# Patient Record
Sex: FEMALE | Race: BLACK OR AFRICAN AMERICAN | Employment: FULL TIME | URBAN - METROPOLITAN AREA
[De-identification: names, ages, dates, MRNs, and addresses within clinical notes are randomized per-mention and may not be internally consistent; named-entity substitution may affect disease eponyms.]

---

## 2019-10-20 ENCOUNTER — HOSPITAL ENCOUNTER (EMERGENCY)
Age: 39
Discharge: STILL ADMITTED OR ADMINISTRATIVE | End: 2019-10-20
Attending: EMERGENCY MEDICINE

## 2019-10-26 ENCOUNTER — HOSPITAL ENCOUNTER (OUTPATIENT)
Dept: CT IMAGING | Age: 39
Discharge: HOME | End: 2019-10-26
Attending: NURSE PRACTITIONER
Payer: OTHER GOVERNMENT

## 2019-10-26 DIAGNOSIS — R56.9 SEIZURE (HCC): ICD-10-CM

## 2019-10-26 LAB — CREAT UR-MCNC: 0.8 MG/DL (ref 0.6–1.3)

## 2019-10-26 PROCEDURE — 82565 ASSAY OF CREATININE: CPT

## 2019-10-26 PROCEDURE — 70470 CT HEAD/BRAIN W/O & W/DYE: CPT

## 2019-10-26 PROCEDURE — 74011636320 HC RX REV CODE- 636/320: Performed by: RADIOLOGY

## 2019-10-26 RX ADMIN — IOPAMIDOL 100 ML: 612 INJECTION, SOLUTION INTRAVENOUS at 10:05

## 2021-07-23 ENCOUNTER — HOSPITAL ENCOUNTER (EMERGENCY)
Age: 41
Discharge: HOME | End: 2021-07-23
Attending: EMERGENCY MEDICINE
Payer: COMMERCIAL

## 2021-07-23 VITALS
DIASTOLIC BLOOD PRESSURE: 95 MMHG | WEIGHT: 223 LBS | TEMPERATURE: 97.8 F | HEART RATE: 96 BPM | RESPIRATION RATE: 17 BRPM | SYSTOLIC BLOOD PRESSURE: 182 MMHG | OXYGEN SATURATION: 100 %

## 2021-07-23 DIAGNOSIS — H10.12 ALLERGIC CONJUNCTIVITIS OF LEFT EYE: ICD-10-CM

## 2021-07-23 DIAGNOSIS — H11.422 CHEMOSIS OF CONJUNCTIVA, LEFT: Primary | ICD-10-CM

## 2021-07-23 PROCEDURE — 74011636637 HC RX REV CODE- 636/637: Performed by: EMERGENCY MEDICINE

## 2021-07-23 PROCEDURE — 99284 EMERGENCY DEPT VISIT MOD MDM: CPT

## 2021-07-23 PROCEDURE — 74011000250 HC RX REV CODE- 250: Performed by: EMERGENCY MEDICINE

## 2021-07-23 PROCEDURE — 74011250637 HC RX REV CODE- 250/637: Performed by: EMERGENCY MEDICINE

## 2021-07-23 PROCEDURE — 99281 EMR DPT VST MAYX REQ PHY/QHP: CPT

## 2021-07-23 RX ORDER — DIPHENHYDRAMINE HCL 25 MG
50 CAPSULE ORAL
Status: DISCONTINUED | OUTPATIENT
Start: 2021-07-23 | End: 2021-07-24 | Stop reason: HOSPADM

## 2021-07-23 RX ORDER — CIPROFLOXACIN HYDROCHLORIDE 3.5 MG/ML
2 SOLUTION/ DROPS TOPICAL
Status: COMPLETED | OUTPATIENT
Start: 2021-07-23 | End: 2021-07-23

## 2021-07-23 RX ORDER — PROPARACAINE HYDROCHLORIDE 5 MG/ML
2 SOLUTION/ DROPS OPHTHALMIC
Status: DISCONTINUED | OUTPATIENT
Start: 2021-07-23 | End: 2021-07-24 | Stop reason: HOSPADM

## 2021-07-23 RX ORDER — DIPHENHYDRAMINE HCL 25 MG
CAPSULE ORAL
Qty: 16 CAPSULE | Refills: 0 | Status: SHIPPED | OUTPATIENT
Start: 2021-07-23

## 2021-07-23 RX ORDER — PREDNISONE 20 MG/1
40 TABLET ORAL
Status: COMPLETED | OUTPATIENT
Start: 2021-07-23 | End: 2021-07-23

## 2021-07-23 RX ORDER — LORATADINE 10 MG/1
TABLET ORAL
Qty: 14 TABLET | Refills: 0 | Status: SHIPPED | OUTPATIENT
Start: 2021-07-23

## 2021-07-23 RX ORDER — PREDNISONE 20 MG/1
40 TABLET ORAL DAILY
Qty: 10 TABLET | Refills: 0 | Status: SHIPPED | OUTPATIENT
Start: 2021-07-23 | End: 2021-07-28

## 2021-07-23 RX ORDER — CIPROFLOXACIN HYDROCHLORIDE 3.5 MG/ML
1 SOLUTION/ DROPS TOPICAL 4 TIMES DAILY
Qty: 2.5 ML | Refills: 0 | Status: SHIPPED | OUTPATIENT
Start: 2021-07-23

## 2021-07-23 RX ORDER — FAMOTIDINE 20 MG/1
40 TABLET, FILM COATED ORAL
Status: COMPLETED | OUTPATIENT
Start: 2021-07-23 | End: 2021-07-23

## 2021-07-23 RX ORDER — FAMOTIDINE 20 MG/1
20 TABLET, FILM COATED ORAL 2 TIMES DAILY
Qty: 10 TABLET | Refills: 0 | Status: SHIPPED | OUTPATIENT
Start: 2021-07-23 | End: 2021-07-28

## 2021-07-23 RX ADMIN — PREDNISONE 40 MG: 20 TABLET ORAL at 20:19

## 2021-07-23 RX ADMIN — CIPROFLOXACIN 2 DROP: 3 SOLUTION OPHTHALMIC at 20:19

## 2021-07-23 RX ADMIN — FAMOTIDINE 40 MG: 20 TABLET, FILM COATED ORAL at 20:19

## 2021-07-27 NOTE — ED PROVIDER NOTES
EMERGENCY DEPARTMENT HISTORY AND PHYSICAL EXAM    Date: 7/23/2021  Patient Name: Rashi De La Torre    History of Presenting Illness     Chief Complaint   Patient presents with    Eye Pain         History Provided By: Patient    Additional History (Context):   7:45 PM  Rashi De La Torre is a 36 y.o. female with PMHX of HTN, Seizures who presents to the emergency department C/O swollen eye. Patient states she was applying eye make-up when she had brushed my contact with her eye. She is a contact lens wearer and was using as of the time of this incident. Contacts was removed but now she has popping and swelling \"to the white part of my eye\". She has no significant allergies otherwise. She denies any other significant impact or injury. She has no significant history of skin reactions or allergies. She does have a history of high blood pressure and takes her medications regularly. Blood pressure is elevated but some from the eye incident she denies any headache chest pain shortness of breath or changes in urinary output or color. Social History  No smoking, drinking or drugs    Family History  No history of glaucoma or significant allergies. PCP: Dawood, MD Hong    Current Outpatient Medications   Medication Sig Dispense Refill    ciprofloxacin HCl (Ciloxan) 0.3 % ophthalmic solution Apply 1 Drop to eye four (4) times daily. 2.5 mL 0    diphenhydrAMINE (BenadryL) 25 mg capsule Take 1-2 tabs every 6 hours as needed for more severe symptoms. 16 Capsule 0    loratadine (Claritin) 10 mg tablet Take 1 tab by mouth daily for seven (7) days. Continue after 7 days only if symptoms are still present. Restart for 7 day intervals if sinus congestion symptoms return. 14 Tablet 0    famotidine (Pepcid) 20 mg tablet Take 1 Tablet by mouth two (2) times a day for 5 days. 10 Tablet 0    predniSONE (DELTASONE) 20 mg tablet Take 40 mg by mouth daily for 5 doses.  With Breakfast 10 Tablet 0       Past History     Past Medical History:  Past Medical History:   Diagnosis Date    Hypertension     Seizures ([address]        Past Surgical History:  No past surgical history on file. Family History:  No family history on file. Social History:  Social History     Tobacco Use    Smoking status: Not on file   Substance Use Topics    Alcohol use: Not on file    Drug use: Not on file       Allergies:  No Known Allergies      Review of Systems   Review of Systems   Eyes: Positive for itching. Negative for photophobia, pain, discharge, redness and visual disturbance. Respiratory: Negative for shortness of breath. Skin: Negative for rash. All other systems reviewed and are negative. Physical Exam     Vitals:    07/23/21 1937 07/23/21 2030   BP: (!) 181/91 (!) 182/95   Pulse: (!) 110 96   Resp: 20 17   Temp: 97.8 °F (36.6 °C)    SpO2: 100% 100%   Weight: 101.2 kg (223 lb)      Physical Exam  Vitals and nursing note reviewed. Constitutional:       General: She is not in acute distress. Appearance: She is well-developed. She is not ill-appearing, toxic-appearing or diaphoretic. HENT:      Head: Normocephalic and atraumatic. Eyes:      General: No scleral icterus. Extraocular Movements:      Right eye: Normal extraocular motion and no nystagmus. Left eye: Normal extraocular motion and no nystagmus. Conjunctiva/sclera: Conjunctivae normal.      Right eye: Right conjunctiva is not injected. No chemosis, exudate or hemorrhage. Left eye: Chemosis present. No exudate or hemorrhage. Pupils: Pupils are equal, round, and reactive to light. Left eye: Pupil is round, reactive and not sluggish. No corneal abrasion or fluorescein uptake. Dory exam negative. Funduscopic exam:        Left eye: No hemorrhage or AV nicking. Slit lamp exam:     Left eye: Anterior chamber quiet. No corneal ulcer, hyphema, hypopyon, anterior chamber bulge or photophobia.       Comments: OS with minimal injection   Neck: Trachea: No tracheal deviation. Pulmonary:      Effort: Pulmonary effort is normal. No respiratory distress. Breath sounds: No stridor. Musculoskeletal:         General: No tenderness. Normal range of motion. Cervical back: Normal range of motion and neck supple. Comments: Grossly unremarkable without abnormalities   Skin:     General: Skin is warm and dry. Capillary Refill: Capillary refill takes less than 2 seconds. Findings: No erythema or rash. Neurological:      Mental Status: She is alert and oriented to person, place, and time. GCS: GCS eye subscore is 4. GCS verbal subscore is 5. GCS motor subscore is 6. Cranial Nerves: No cranial nerve deficit. Psychiatric:         Attention and Perception: Attention normal.         Mood and Affect: Mood normal.         Speech: Speech normal.         Behavior: Behavior normal.         Thought Content: Thought content normal.         Judgment: Judgment normal.       Diagnostic Study Results     Labs -  No results found for this or any previous visit (from the past 24 hour(s)). Radiologic Studies -   No orders to display     CT Results  (Last 48 hours)    None        CXR Results  (Last 48 hours)    None          Medications given in the ED-  Medications   ciprofloxacin HCl (CILOXAN) 0.3 % ophthalmic solution 2 Drop (2 Drops Left Eye Given 7/23/21 2019)   famotidine (PEPCID) tablet 40 mg (40 mg Oral Given 7/23/21 2019)   predniSONE (DELTASONE) tablet 40 mg (40 mg Oral Given 7/23/21 2019)         Medical Decision Making   I am the first provider for this patient. I reviewed the vital signs, available nursing notes, past medical history, past surgical history, family history and social history. Vital Signs-Reviewed the patient's vital signs.     Pulse Oximetry Analysis - 100% on room air      Records Reviewed: NURSING NOTES AND PREVIOUS MEDICAL RECORDS    Provider Notes (Medical Decision Making):   Patient with isolated eye swelling to the sclera consistent with chemosis reaction. Fluorescein was used confirming she did not have any corneal abrasion or ulceration and this is likely due to the protection of her contact lens. Described the mechanism and she shows no evidence of corneal injury perforation or Dory sign. Started protective antibiotics and suggested she use glasses only wished her precautions until she has complete release motion recommended she see an optometrist or preferably on ophthalmologist in follow-up. Orally she can use allergy-like medications to minimize allergic type response. No indication for oral antibiotics. Early outpatient follow-up recommended and she was given the names of [facility] ophthalmology. To be noted the record we have no ophthalmology on call for this evening and do not find any indications that she requires emergent transfer. Procedures:  Procedures    ED Course:   10:12 PM: Initial assessment performed. The patients presenting problems have been discussed, and they are in agreement with the care plan formulated and outlined with them. I have encouraged them to ask questions as they arise throughout their visit. Diagnosis and Disposition       DISCHARGE NOTE:  9:47 PM  Camilo Erickson's  results have been reviewed with her. She has been counseled regarding her diagnosis, treatment, and plan. She verbally conveys understanding and agreement of the signs, symptoms, diagnosis, treatment and prognosis and additionally agrees to follow up as discussed. She also agrees with the care-plan and conveys that all of her questions have been answered. I have also provided discharge instructions for her that include: educational information regarding their diagnosis and treatment, and list of reasons why they would want to return to the ED prior to their follow-up appointment, should her condition change. She has been provided with education for proper emergency department utilization. CLINICAL IMPRESSION:    1. Chemosis of conjunctiva, left    2. Allergic conjunctivitis of left eye        PLAN:  1. D/C Home  2. Discharge Medication List as of 7/23/2021  9:47 PM      START taking these medications    Details   ciprofloxacin HCl (Ciloxan) 0.3 % ophthalmic solution Apply 1 Drop to eye four (4) times daily. , Normal, Disp-2.5 mL, R-0      diphenhydrAMINE (BenadryL) 25 mg capsule Take 1-2 tabs every 6 hours as needed for more severe symptoms. , Normal, Disp-16 Capsule, R-0      loratadine (Claritin) 10 mg tablet Take 1 tab by mouth daily for seven (7) days. Continue after 7 days only if symptoms are still present. Restart for 7 day intervals if sinus congestion symptoms return., Normal, Disp-14 Tablet, R-0      famotidine (Pepcid) 20 mg tablet Take 1 Tablet by mouth two (2) times a day for 5 days. , Normal, Disp-10 Tablet, R-0      predniSONE (DELTASONE) 20 mg tablet Take 40 mg by mouth daily for 5 doses. With Breakfast, Normal, Disp-10 Tablet, R-0           3. Follow-up Information     Follow up With Specialties Details Why Contact Info    Vonnie De Dios NP Nurse Practitioner In 1 week  [address] [facility],[address]      Chelle Saunders MD Ophthalmology In 4 days  [place] [address]  [phone]          _______________________________    This note was partially transcribed via voice recognition software. Although efforts have been made to catch any discrepancies, it may contain sound alike words, grammatical errors, or nonsensical words.

## 2021-10-30 ENCOUNTER — HOSPITAL ENCOUNTER (EMERGENCY)
Age: 41
Discharge: HOME | End: 2021-10-31
Attending: STUDENT IN AN ORGANIZED HEALTH CARE EDUCATION/TRAINING PROGRAM
Payer: COMMERCIAL

## 2021-10-30 ENCOUNTER — APPOINTMENT (OUTPATIENT)
Dept: GENERAL RADIOLOGY | Age: 41
End: 2021-10-30
Attending: PHYSICIAN ASSISTANT
Payer: COMMERCIAL

## 2021-10-30 DIAGNOSIS — R06.2 WHEEZING: ICD-10-CM

## 2021-10-30 DIAGNOSIS — J20.9 ACUTE BRONCHITIS, UNSPECIFIED ORGANISM: ICD-10-CM

## 2021-10-30 DIAGNOSIS — J06.9 ACUTE UPPER RESPIRATORY INFECTION: Primary | ICD-10-CM

## 2021-10-30 LAB
ALBUMIN SERPL-MCNC: 3.9 G/DL (ref 3.4–5)
ALBUMIN/GLOB SERPL: 1.1 {RATIO} (ref 0.8–1.7)
ALP SERPL-CCNC: 76 U/L (ref 45–117)
ALT SERPL-CCNC: 17 U/L (ref 13–56)
ANION GAP SERPL CALC-SCNC: 4 MMOL/L (ref 3–18)
AST SERPL-CCNC: 10 U/L (ref 10–38)
BASOPHILS # BLD: 0.1 K/UL (ref 0–0.1)
BASOPHILS NFR BLD: 1 % (ref 0–2)
BILIRUB SERPL-MCNC: 0.1 MG/DL (ref 0.2–1)
BNP SERPL-MCNC: 30 PG/ML (ref 0–450)
BUN SERPL-MCNC: 12 MG/DL (ref 7–18)
BUN/CREAT SERPL: 14 (ref 12–20)
CALCIUM SERPL-MCNC: 8.8 MG/DL (ref 8.5–10.1)
CHLORIDE SERPL-SCNC: 106 MMOL/L (ref 100–111)
CK MB CFR SERPL CALC: 0.7 % (ref 0–4)
CK MB SERPL-MCNC: 1.6 NG/ML (ref 5–25)
CK SERPL-CCNC: 218 U/L (ref 26–192)
CO2 SERPL-SCNC: 27 MMOL/L (ref 21–32)
CREAT SERPL-MCNC: 0.85 MG/DL (ref 0.6–1.3)
DIFFERENTIAL METHOD BLD: ABNORMAL
EOSINOPHIL # BLD: 0.6 K/UL (ref 0–0.4)
EOSINOPHIL NFR BLD: 6 % (ref 0–5)
ERYTHROCYTE [DISTWIDTH] IN BLOOD BY AUTOMATED COUNT: 21.6 % (ref 11.6–14.5)
GLOBULIN SER CALC-MCNC: 3.6 G/DL (ref 2–4)
GLUCOSE SERPL-MCNC: 160 MG/DL (ref 74–99)
HCG SERPL QL: NEGATIVE
HCT VFR BLD AUTO: 42.8 % (ref 35–45)
HGB BLD-MCNC: 13.6 G/DL (ref 12–16)
LYMPHOCYTES # BLD: 3.3 K/UL (ref 0.9–3.6)
LYMPHOCYTES NFR BLD: 31 % (ref 21–52)
MCH RBC QN AUTO: 26.9 PG (ref 24–34)
MCHC RBC AUTO-ENTMCNC: 31.8 G/DL (ref 31–37)
MCV RBC AUTO: 84.6 FL (ref 78–100)
MONOCYTES # BLD: 0.5 K/UL (ref 0.05–1.2)
MONOCYTES NFR BLD: 5 % (ref 3–10)
NEUTS BAND NFR BLD MANUAL: 1 % (ref 0–5)
NEUTS SEG # BLD: 6.2 K/UL (ref 1.8–8)
NEUTS SEG NFR BLD: 56 % (ref 40–73)
PLATELET # BLD AUTO: 417 K/UL (ref 135–420)
PLATELET COMMENTS,PCOM: ABNORMAL
PMV BLD AUTO: 8.3 FL (ref 9.2–11.8)
POTASSIUM SERPL-SCNC: 3.8 MMOL/L (ref 3.5–5.5)
PROT SERPL-MCNC: 7.5 G/DL (ref 6.4–8.2)
RBC # BLD AUTO: 5.06 M/UL (ref 4.2–5.3)
RBC MORPH BLD: ABNORMAL
RBC MORPH BLD: ABNORMAL
SARS-COV-2, COV2: NORMAL
SODIUM SERPL-SCNC: 137 MMOL/L (ref 136–145)
TROPONIN I SERPL-MCNC: <0.02 NG/ML (ref 0–0.04)
WBC # BLD AUTO: 10.7 K/UL (ref 4.6–13.2)

## 2021-10-30 PROCEDURE — 74011000250 HC RX REV CODE- 250: Performed by: STUDENT IN AN ORGANIZED HEALTH CARE EDUCATION/TRAINING PROGRAM

## 2021-10-30 PROCEDURE — 99284 EMERGENCY DEPT VISIT MOD MDM: CPT

## 2021-10-30 PROCEDURE — U0003 INFECTIOUS AGENT DETECTION BY NUCLEIC ACID (DNA OR RNA); SEVERE ACUTE RESPIRATORY SYNDROME CORONAVIRUS 2 (SARS-COV-2) (CORONAVIRUS DISEASE [COVID-19]), AMPLIFIED PROBE TECHNIQUE, MAKING USE OF HIGH THROUGHPUT TECHNOLOGIES AS DESCRIBED BY CMS-2020-01-R: HCPCS

## 2021-10-30 PROCEDURE — 82553 CREATINE MB FRACTION: CPT

## 2021-10-30 PROCEDURE — 83880 ASSAY OF NATRIURETIC PEPTIDE: CPT

## 2021-10-30 PROCEDURE — 71045 X-RAY EXAM CHEST 1 VIEW: CPT

## 2021-10-30 PROCEDURE — 80053 COMPREHEN METABOLIC PANEL: CPT

## 2021-10-30 PROCEDURE — 84703 CHORIONIC GONADOTROPIN ASSAY: CPT

## 2021-10-30 PROCEDURE — 74011000258 HC RX REV CODE- 258: Performed by: STUDENT IN AN ORGANIZED HEALTH CARE EDUCATION/TRAINING PROGRAM

## 2021-10-30 PROCEDURE — 94640 AIRWAY INHALATION TREATMENT: CPT

## 2021-10-30 PROCEDURE — 85025 COMPLETE CBC W/AUTO DIFF WBC: CPT

## 2021-10-30 PROCEDURE — 93005 ELECTROCARDIOGRAM TRACING: CPT

## 2021-10-30 PROCEDURE — 96365 THER/PROPH/DIAG IV INF INIT: CPT

## 2021-10-30 PROCEDURE — 74011250636 HC RX REV CODE- 250/636: Performed by: STUDENT IN AN ORGANIZED HEALTH CARE EDUCATION/TRAINING PROGRAM

## 2021-10-30 PROCEDURE — 96375 TX/PRO/DX INJ NEW DRUG ADDON: CPT

## 2021-10-30 RX ORDER — IPRATROPIUM BROMIDE AND ALBUTEROL SULFATE 2.5; .5 MG/3ML; MG/3ML
3 SOLUTION RESPIRATORY (INHALATION)
Status: COMPLETED | OUTPATIENT
Start: 2021-10-30 | End: 2021-10-30

## 2021-10-30 RX ADMIN — METHYLPREDNISOLONE SODIUM SUCCINATE 125 MG: 125 INJECTION, POWDER, FOR SOLUTION INTRAMUSCULAR; INTRAVENOUS at 22:37

## 2021-10-30 RX ADMIN — DOXYCYCLINE 100 MG: 100 INJECTION, POWDER, LYOPHILIZED, FOR SOLUTION INTRAVENOUS at 22:39

## 2021-10-30 RX ADMIN — IPRATROPIUM BROMIDE AND ALBUTEROL SULFATE 3 ML: .5; 3 SOLUTION RESPIRATORY (INHALATION) at 22:34

## 2021-10-30 RX ADMIN — SODIUM CHLORIDE, POTASSIUM CHLORIDE, SODIUM LACTATE AND CALCIUM CHLORIDE 500 ML: 600; 310; 30; 20 INJECTION, SOLUTION INTRAVENOUS at 23:19

## 2021-10-31 VITALS
TEMPERATURE: 97.1 F | HEART RATE: 85 BPM | OXYGEN SATURATION: 97 % | RESPIRATION RATE: 20 BRPM | SYSTOLIC BLOOD PRESSURE: 160 MMHG | HEIGHT: 65 IN | WEIGHT: 205 LBS | DIASTOLIC BLOOD PRESSURE: 86 MMHG | BODY MASS INDEX: 34.16 KG/M2

## 2021-10-31 LAB
ATRIAL RATE: 94 BPM
CALCULATED P AXIS, ECG09: 78 DEGREES
CALCULATED R AXIS, ECG10: 63 DEGREES
CALCULATED T AXIS, ECG11: 81 DEGREES
DIAGNOSIS, 93000: NORMAL
P-R INTERVAL, ECG05: 146 MS
Q-T INTERVAL, ECG07: 380 MS
QRS DURATION, ECG06: 78 MS
QTC CALCULATION (BEZET), ECG08: 475 MS
VENTRICULAR RATE, ECG03: 94 BPM

## 2021-10-31 RX ORDER — DOXYCYCLINE HYCLATE 100 MG
100 TABLET ORAL 2 TIMES DAILY
Qty: 14 TABLET | Refills: 0 | Status: SHIPPED | OUTPATIENT
Start: 2021-10-31 | End: 2021-11-07

## 2021-10-31 RX ORDER — ALBUTEROL SULFATE 90 UG/1
2 AEROSOL, METERED RESPIRATORY (INHALATION)
Qty: 1 EACH | Refills: 0 | Status: SHIPPED | OUTPATIENT
Start: 2021-10-31 | End: 2021-11-14

## 2021-10-31 RX ORDER — PREDNISONE 50 MG/1
50 TABLET ORAL DAILY
Qty: 3 TABLET | Refills: 0 | Status: SHIPPED | OUTPATIENT
Start: 2021-10-31 | End: 2021-11-03

## 2021-10-31 NOTE — ED TRIAGE NOTES
Pt c/o shortness of breath onset 10/22. States it has progressively worsened since, becomes short of breath with minimal exertion. O2 93% on room air in triage.

## 2021-10-31 NOTE — ED NOTES
Pt ambulated to bathroom in pt room to void. Audible wheezing heard from pt while ambulating back to bed. Oxygen readings stable on room air.

## 2021-10-31 NOTE — DISCHARGE INSTRUCTIONS
Please return to the ER with any new or worsening symptoms or any other concerns. Please return to the Emergency Department if you develop a fever, chills, cannot eat or drink due to nausea or vomiting, or if any of your symptoms worsen. Please follow up with PCP    Also, It is extremely important that you follow-up with a primary care physician and if you do not have one currently use the contact information provided to obtain an appointment. If none was provided please call the number on the back of your insurance card to locate a Primary care doctor. Many offices have \"cancellation lists\" that you can ask to be placed on; should a patient with an earlier appointment cancel you will be notified to take their place. Please return to the Emergency Room immediately if your symptoms worsen. Please carefully read all discharge instructions    [email or web address]    What are GoodRx coupons? GoodRx coupons will help you pay less than the cash price for your prescription. [employer] free to use and are accepted at virtually every [place] pharmacy. Your pharmacist will know how to enter the codes on the coupon to pull up the lowest discount available.

## 2021-11-01 LAB — SARS-COV-2, NAA: NOT DETECTED

## 2021-11-07 NOTE — ED PROVIDER NOTES
EMERGENCY DEPARTMENT HISTORY AND PHYSICAL EXAM      Date: 10/30/2021  Patient Name: Nicanor Chiu    History of Presenting Illness     Chief Complaint   Patient presents with    Shortness of Breath       History Provided By: Patient    HPI:    Nicanor Chiu is a 39 y.o. female with no significant medical history other than hypertension, who presents to the ED with complaints of cough and congestion for several days (around 7), with feeling like she is wheezing. There is no peritonsillar abscess noted on exam, physical presentation does not indicated epiglottitis or retropharyngeal abscess. She is breathing normally without any impairments, speaking without difficulty during evaluation. Not tripoding or using accessory muscles to breath, no signs of retractions on exam or pursed lip breathing. Moving air well on ausculation without any adventitious sounds other than a mild wheeze (coarse upper)    She is able to answer questions in quick succession without evidence of dyspnea    The patient's cough has not been productive. No hemoptysis or chest pain. The patient denies LE pain or swelling - and has no history of DVT or PE, no family history of clotting disorders, no recent travel, no immobilization, recent Sg, exog. Estrogen or steroid use, or known CA. The patient denies any aggravating or alleviating factors - and has not taken any medications in an attempt to alleviate her symptoms. PMH, PSH, family history, social history, allergies reviewed with the patient with significant items noted above. PCP: Hong Seay MD    Current Outpatient Medications   Medication Sig Dispense Refill    albuterol (PROVENTIL HFA, VENTOLIN HFA, PROAIR HFA) 90 mcg/actuation inhaler Take 2 Puffs by inhalation every four (4) hours as needed for Wheezing for up to 14 days. 1 Each 0    inhalational spacing device 1 Each by Does Not Apply route as needed for Wheezing.  1 Each 0    ciprofloxacin HCl (Ciloxan) 0.3 % ophthalmic solution Apply 1 Drop to eye four (4) times daily. 2.5 mL 0    diphenhydrAMINE (BenadryL) 25 mg capsule Take 1-2 tabs every 6 hours as needed for more severe symptoms. 16 Capsule 0    loratadine (Claritin) 10 mg tablet Take 1 tab by mouth daily for seven (7) days. Continue after 7 days only if symptoms are still present. Restart for 7 day intervals if sinus congestion symptoms return. 14 Tablet 0       Past History     Past Medical History:  Past Medical History:   Diagnosis Date    Hypertension     Seizures ([address]        Past Surgical History:  No past surgical history on file. Family History:  No family history on file.     Social History:  Social History     Tobacco Use    Smoking status: Former Smoker    Smokeless tobacco: Never Used   Substance Use Topics    Alcohol use: Not Currently    Drug use: Never       Allergies:  No Known Allergies    Review of Systems   Review of Systems    In addition to that documented in the HPI above  All other review of systems negative    Constitutional: Denies fevers or chills  Eyes: Denies vision changes  ENMT: Denies sore throat  CV: Denies chest pain  Resp: Denies SOB  GI: Denies vomiting or diarrhea  : Denies painful urination  MSK: Denies recent trauma  Skin: Denies new rashes  Neuro: Denies new numbness or tingling or weakness  Endocrine: Denies polyuria  Heme: Denies bleeding disorders    Physical Exam     Vitals:    10/30/21 2245 10/30/21 2249 10/30/21 2315 10/30/21 2345   BP: (!) 181/106  (!) 165/93 (!) 160/86   Pulse:  85     Resp:       Temp:       SpO2: 96%  97% 97%   Weight:       Height:         Physical Exam    Nursing notes and vital signs reviewed    General: Patient is awake and alert, resting comfortably in no acute distress  Head: Normocephalic and atraumatic  Eyes: Extraocular muscles intact, no conjunctival pallor  Ear, nose, throat: Normal external exam  Neck: Normal range of motion  Cardiovascular: RRR, no murmur auscultated, warm, well-perfused extremities  Respiratory: Patient is in no respiratory distress, lungs with end expiratory wheeze  GI: soft, non-tender, non-distended  MSK: No gross deformities appreciated  Extremities: pulses intact with good cap refills, no LE pitting edema or calf tenderness  Skin: Warm, dry, and intact  Neuro: The patient is alert and oriented, no gross motor or sensory defects noted. Psych: Appropriate mood and affect. Medical Decision Making   I am the first provider for this patient. I reviewed the vital signs, available nursing notes, past medical history, past surgical history, family history and social history. Vital Signs-Reviewed the patient's vital signs. Visit Vitals  BP (!) 160/86   Pulse 85   Temp 97.1 °F (36.2 °C)   Resp 20   Ht 5' 5\" (1.651 m)   Wt 93 kg (205 lb)   LMP 10/28/2021   SpO2 97%   BMI 34.11 kg/m²       Pulse Oximetry Analysis - 97% on room air     Cardiac Monitor:  Rate: 85 bpm  Rhythm: NSR    EKG interpretation: (Preliminary)  Interpreted by the EP. EKG non diagnostic, without acute ischemic changes, arrhythmia, prolonged QT, or evidence of Brugada      Provider Notes (Medical Decision Making):   39 y.o. female with shortness of breath, cough with no significant sputum production. Most likely URI, but will provide doxy    Differential diagnosis for the patients cough:  Bronchitis  Pneumonia  Asthma  Bronchospasm  Post nasal drip, allergic rhinitis  Less likely cardiovascular  CHF  Pscyhogenic    Procedures:  Procedures    ED Course:       Patient likely has mild bronchitis or other viral syndrome, is well appearing, nontoxic, and has reassuring exam.  Would benefit from inhaler and doxy  and is appropriate for outpatient care. She is to follow up for hypertension    ED Course as of 11/13/21 1348   Sat Oct 30, 2021   2342 CBC without leukocytosis or anemia.     No electrolyte abnormality on chemistry panel.     [DM]   Sun Oct 31, 2021   0008 12:08 AM  No acute pathology necessitating further emergent workup or hospital admission is suspected or found. Will discharge home with doxy, prednisone, inhaler, spacer, follow up. She is comfortable with the plan and discharge at this time. Expressed the importance of follow up for current symptoms and she agrees and was advised on what signs/symptoms to return immediately to the ER. [DM]      ED Course User Index  [DM] Rachael Keys MD            Vitals Review/addressed -     Diagnostic Study Results     Orders Placed This Encounter    SARS-COV-2 BY BRIELLE     Standing Status:   Standing     Number of Occurrences:   1    XR CHEST PORT     Standing Status:   Standing     Number of Occurrences:   1     Order Specific Question:   Transport:     Answer:   No Transport [3]     Order Specific Question:   Reason for Exam     Answer:   shortness of breath     Order Specific Question:   Is Patient Pregnant? Answer:   No    CBC WITH AUTOMATED DIFF     Standing Status:   Standing     Number of Occurrences:   1    CARDIAC PANEL,(CK, CKMB & TROPONIN)     Standing Status:   Standing     Number of Occurrences:   1    METABOLIC PANEL, COMPREHENSIVE     Standing Status:   Standing     Number of Occurrences:   1    Pro BNP     Standing Status:   Standing     Number of Occurrences:   1    HCG QL SERUM     Standing Status:   Standing     Number of Occurrences:   1    SARS-COV-2     Standing Status:   Standing     Number of Occurrences:   1     Order Specific Question:   Specimen source     Answer:   NASOPHARYNGEAL SWAB [650]     Order Specific Question:   Is this test for diagnosis or screening? Answer:   Diagnosis of ill patient     Order Specific Question:   Symptomatic for COVID-19 as defined by CDC? Answer:   Yes     Order Specific Question:   Date of Symptom Onset     Answer:   10/28/2021     Order Specific Question:   Hospitalized for COVID-19?      Answer:   No     Order Specific Question:   Admitted to ICU for COVID-19? Answer:   No     Order Specific Question:   Employed in healthcare setting? Answer:   No     Order Specific Question:   Resident in a congregate (group) care setting? Answer:   No     Order Specific Question:   Pregnant? Answer:   No     Order Specific Question:   Previously tested for COVID-19? Answer:   Unknown    EKG, 12 LEAD, INITIAL     Standing Status:   Standing     Number of Occurrences:   1     Order Specific Question:   Reason for Exam:     Answer:   Shortness of breath    methylPREDNISolone (PF) (Solu-MEDROL) injection 125 mg    albuterol-ipratropium (DUO-NEB) 2.5 MG-0.5 MG/3 ML     Order Specific Question:   MODE OF DELIVERY     Answer:   Nebulizer     Order Specific Question:   Initiate RT Bronchodilator Protocol     Answer: Yes    doxycycline (VIBRAMYCIN) 100 mg in 0.9% sodium chloride (MBP/ADV) 100 mL MBP     Order Specific Question:   Antibiotic Indications     Answer:   Pneumonia (HAP)    lactated ringers bolus infusion 500 mL    doxycycline (VIBRA-TABS) 100 mg tablet     Sig: Take 1 Tablet by mouth two (2) times a day for 7 days. Dispense:  14 Tablet     Refill:  0    predniSONE (DELTASONE) 50 mg tablet     Sig: Take 1 Tablet by mouth daily for 3 days. Dispense:  3 Tablet     Refill:  0    albuterol (PROVENTIL HFA, VENTOLIN HFA, PROAIR HFA) 90 mcg/actuation inhaler     Sig: Take 2 Puffs by inhalation every four (4) hours as needed for Wheezing for up to 14 days. Dispense:  1 Each     Refill:  0    inhalational spacing device     Si Each by Does Not Apply route as needed for Wheezing. Dispense:  1 Each     Refill:  0       Labs -   No results found for this or any previous visit (from the past 12 hour(s)).     Radiologic Studies -   XR CHEST PORT   Final Result   No acute process        CT Results  (Last 48 hours)    None        CXR Results  (Last 48 hours)    None          Disposition     No acute pathology necessitating further emergent workup or hospital admission is suspected or found. Will discharge home with inhaler and doxy. Expressed the importance of follow up for current symptoms and she agrees and was advised on what signs/symptoms to return immediately to the ER. Disposition:  Home    CLINICAL IMPRESSION:    1. Acute upper respiratory infection    2. Acute bronchitis, unspecified organism    3. Wheezing        It should be noted that I will be the provider of record for this patient  Maty Cespedes MD    Follow-up Information     Follow up With Specialties Details Why Contact Info    [facility] EMERGENCY DEPT Emergency Medicine   [phone]    [address] at [facility]  Call  if you do not have a PCP [address] [place] [id number]  [address] at [facility]  Call  if you do not have a PCP [address] [place] [address] [phone]          Discharge Medication List as of 10/31/2021 12:08 AM      START taking these medications    Details   doxycycline (VIBRA-TABS) 100 mg tablet Take 1 Tablet by mouth two (2) times a day for 7 days. , Normal, Disp-14 Tablet, R-0      predniSONE (DELTASONE) 50 mg tablet Take 1 Tablet by mouth daily for 3 days. , Normal, Disp-3 Tablet, R-0      albuterol (PROVENTIL HFA, VENTOLIN HFA, PROAIR HFA) 90 mcg/actuation inhaler Take 2 Puffs by inhalation every four (4) hours as needed for Wheezing for up to 14 days. , Normal, Disp-1 Each, R-0      inhalational spacing device 1 Each by Does Not Apply route as needed for Wheezing., Normal, Disp-1 Each, R-0         CONTINUE these medications which have NOT CHANGED    Details   ciprofloxacin HCl (Ciloxan) 0.3 % ophthalmic solution Apply 1 Drop to eye four (4) times daily. , Normal, Disp-2.5 mL, R-0      diphenhydrAMINE (BenadryL) 25 mg capsule Take 1-2 tabs every 6 hours as needed for more severe symptoms. , Normal, Disp-16 Capsule, R-0 loratadine (Claritin) 10 mg tablet Take 1 tab by mouth daily for seven (7) days. Continue after 7 days only if symptoms are still present. Restart for 7 day intervals if sinus congestion symptoms return., Normal, Disp-14 Tablet, R-0             Please note that this dictation was completed with [employer], the [employer] voice recognition software. Quite often unanticipated grammatical, syntax, homophones, and other interpretive errors are inadvertently transcribed by the computer software. Please disregard these errors. Please excuse any errors that have escaped final proofreading.